# Patient Record
Sex: FEMALE | Race: WHITE | Employment: STUDENT | URBAN - METROPOLITAN AREA
[De-identification: names, ages, dates, MRNs, and addresses within clinical notes are randomized per-mention and may not be internally consistent; named-entity substitution may affect disease eponyms.]

---
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
## 2022-01-25 RX ORDER — NORGESTIMATE AND ETHINYL ESTRADIOL 0.25-0.035
1 KIT ORAL DAILY
Qty: 84 TABLET | Refills: 1 | Status: SHIPPED | OUTPATIENT
Start: 2022-01-25

## 2022-05-05 ENCOUNTER — TELEPHONE (OUTPATIENT)
Dept: FAMILY MEDICINE CLINIC | Facility: CLINIC | Age: 22
End: 2022-05-05

## 2022-05-05 NOTE — TELEPHONE ENCOUNTER
That is fine, but will need to be selected as \"Dispense as Written\" for that to happen. Please also make sure she knows this means she will likely have a higher co-pay since it is not generic.

## 2022-05-05 NOTE — TELEPHONE ENCOUNTER
Per patient,she is on Accutane,  she was on different kind of birth control for over a year and then was changed to a different brand  3 months ago (Chichi Cope ), then was changed again to a different brand  JEREMY  And she just started taking it 2 days ago. Patient  would like to request NOT TO CHANGE  her birth control medication  to a different brand  for her next refill. States that she would like to keep [address] birth control from now on. Informed that we will send the message to DR ANNIE SAHU.

## 2022-05-06 NOTE — TELEPHONE ENCOUNTER
Epic chart reviewed and patient advised that she is getting the same medication since August of last year. The manufacturers have been bought out a couple of times and the appearance of the medication may change. The components of the medication do not change. Reconfirmed this with the pharmacy and left message that If having symptoms that are of concern schedule an appointment with Dr. Denis Alanis to discuss.

## 2022-08-17 RX ORDER — NORGESTIMATE AND ETHINYL ESTRADIOL 0.25-0.035
1 KIT ORAL DAILY
Qty: 270 TABLET | Refills: 0 | Status: SHIPPED | OUTPATIENT
Start: 2022-08-17

## 2022-08-25 RX ORDER — NORGESTIMATE AND ETHINYL ESTRADIOL 0.25-0.035
1 KIT ORAL DAILY
Qty: 270 TABLET | Refills: 0 | Status: SHIPPED | OUTPATIENT
Start: 2022-08-25

## 2023-08-04 ENCOUNTER — OFFICE VISIT (OUTPATIENT)
Dept: SURGERY | Facility: CLINIC | Age: 23
End: 2023-08-04
Payer: COMMERCIAL

## 2023-08-04 VITALS
BODY MASS INDEX: 24.1 KG/M2 | SYSTOLIC BLOOD PRESSURE: 102 MMHG | WEIGHT: 146.38 LBS | HEIGHT: 65.5 IN | DIASTOLIC BLOOD PRESSURE: 56 MMHG

## 2023-08-04 DIAGNOSIS — Z01.419 WOMEN'S ANNUAL ROUTINE GYNECOLOGICAL EXAMINATION: ICD-10-CM

## 2023-08-04 DIAGNOSIS — Z12.4 ROUTINE CERVICAL SMEAR: Primary | ICD-10-CM

## 2023-08-04 PROCEDURE — 3074F SYST BP LT 130 MM HG: CPT | Performed by: OBSTETRICS & GYNECOLOGY

## 2023-08-04 PROCEDURE — 3008F BODY MASS INDEX DOCD: CPT | Performed by: OBSTETRICS & GYNECOLOGY

## 2023-08-04 PROCEDURE — 99385 PREV VISIT NEW AGE 18-39: CPT | Performed by: OBSTETRICS & GYNECOLOGY

## 2023-08-04 PROCEDURE — 88175 CYTOPATH C/V AUTO FLUID REDO: CPT | Performed by: OBSTETRICS & GYNECOLOGY

## 2023-08-04 PROCEDURE — 3078F DIAST BP <80 MM HG: CPT | Performed by: OBSTETRICS & GYNECOLOGY

## 2023-08-04 RX ORDER — LEVONORGESTREL AND ETHINYL ESTRADIOL 0.1-0.02MG
1 KIT ORAL DAILY
Qty: 90 TABLET | Refills: 3 | Status: SHIPPED | OUTPATIENT
Start: 2023-08-04 | End: 2024-07-29

## 2023-08-09 NOTE — TELEPHONE ENCOUNTER
Please review. Protocol failed / No Protocol. LOV 6/23    Sertraline will start on 25 mg daily and only increase to 50 mg if feeling stable without significant side effects after 1-2 weeks. Will check in with me in the next 4-6 weeks     Requested Prescriptions   Pending Prescriptions Disp Refills    sertraline 25 MG Oral Tab 90 tablet 0     Sig: Take 1 tablet (25 mg total) by mouth daily.        Psychiatric Non-Scheduled (Anti-Anxiety) Failed - 8/8/2023 11:34 AM        Failed - In person appointment or virtual visit in the past 6 mos or appointment in next 3 mos     Recent Outpatient Visits              5 days ago Routine cervical smear    [facility], [address], Dorthey Pay, [place]    Office Visit    7 months ago Severe anxiety with panic    [facility], [address]    Office Visit    2 years ago Other neutropenia [facility])    [facility] Hematology Oncology Ilah Dakin, MD    Office Visit    2 years ago [address], [address], [place], [place]    Office Visit    2 years ago [address], [address], [place], [place]    Office Visit

## 2023-10-16 ENCOUNTER — PATIENT MESSAGE (OUTPATIENT)
Facility: CLINIC | Age: 23
End: 2023-10-16

## 2023-11-13 NOTE — TELEPHONE ENCOUNTER
Protocol failed for appointment only  60 day refill given on 11/13/23, appointment needed for further refills.     Requested Prescriptions   Pending Prescriptions Disp Refills    SERTRALINE 50 MG Oral Tab [Pharmacy Med Name: SERTRALINE 50MG TABLETS] 90 tablet 0     Sig: TAKE 1 TABLET(50 MG) BY MOUTH DAILY       Psychiatric Non-Scheduled (Anti-Anxiety) Failed - 11/11/2023  4:08 PM        Failed - In person appointment or virtual visit in the past 6 mos or appointment in next 3 mos     Recent Outpatient Visits              3 months ago [facility] [address], [address], [place]    Office Visit    10 months ago Severe anxiety with panic    [facility], [address]    Office Visit    2 years ago Other neutropenia [facility])    [facility] Derrick Wooten MD    Office Visit    2 years ago [address], [address] [facility]    Office Visit    2 years ago [address], [address] [facility]    Office Visit                            Recent Outpatient Visits              3 months ago Routine cervical smear    [address], [address], Wolf Mcintosh MD    Office Visit    10 months ago Severe anxiety with panic    [facility], [address]    Office Visit    2 years ago Other neutropenia [facility])    [facility] On[facility] Derrick Wooten MD    Office Visit    2 years ago [address], [place], Car[place] Peppers, [facility]    Office Visit    2 years ago [address], [place], [place], [facility]    Office Visit

## 2023-11-27 RX ORDER — LEVONORGESTREL AND ETHINYL ESTRADIOL 0.1-0.02MG
1 KIT ORAL DAILY
Qty: 28 TABLET | Refills: 0 | OUTPATIENT
Start: 2023-11-27

## 2023-11-27 NOTE — TELEPHONE ENCOUNTER
Spoke with [facility],   verified  She stated pt just p/u rx on 23 for 90 days plus she has 2 more remaining refills. Rx denied       Requested Prescriptions     Pending Prescriptions Disp Refills    Levonorgestrel-Ethinyl Estrad (VIENVA) 0.1-20 MG-MCG Oral Tab 28 tablet 0     Sig: Take 1 tablet by mouth daily.

## 2024-01-11 NOTE — TELEPHONE ENCOUNTER
Message sent to patient to schedule follow up visit.  Please advise on refill request.      Requested Prescriptions     Pending Prescriptions Disp Refills    SERTRALINE 50 MG Oral Tab [Pharmacy Med Name: SERTRALINE 50MG TABLETS] 60 tablet 0     Sig: TAKE 1 TABLET BY MOUTH DAILY(APPOINTMENT NEEDED FOR FURTHER REFILLS)      Recent Visits  Date Type Provider Dept   12/20/22 Office Visit Armida Lopez DO [facility]   Showing recent visits within past 540 days with a meds authorizing provider and meeting all other requirements  Future Appointments  No visits were found meeting these conditions.  Showing future appointments within next 150 days with a meds authorizing provider and meeting all other requirements     Requested Prescriptions   Pending Prescriptions Disp Refills    SERTRALINE 50 MG Oral Tab [Pharmacy Med Name: SERTRALINE 50MG TABLETS] 60 tablet 0     Sig: TAKE 1 TABLET BY MOUTH DAILY(APPOINTMENT NEEDED FOR FURTHER REFILLS)       Psychiatric Non-Scheduled (Anti-Anxiety) Failed - 1/10/2024 12:18 PM        Failed - In person appointment or virtual visit in the past 6 mos or appointment in next 3 mos     Recent Outpatient Visits              1 month ago Anxiety    [facility] [place]    Office Visit    5 months ago Routine cervical smear    [facility] [place] - OB/GYN Amelia Rivera MD    Office Visit    1 year ago Severe anxiety with panic    [facility], [facility] Armida Lopez DO    Office Visit    2 years ago Other neutropenia (HCC)    [facility]ematology Oncology Marilyn Jj MD    Office Visit    2 years ago An[facility]    Office Visit                             Recent Outpatient Visits              1 month ago Anxiety    [facility] [place] Schultz,  ADELA Christianson    Office Visit    5 months ago Routine cervical smear    [facility], [place], [place] - OB/GYN Amelia Rivera MD    Office Visit    1 year ago Severe anxiety with panic    [facility], [facility] Armida Lopez DO    Office Visit    2 years ago Other neutropenia (HCC)    [facility]ematology Oncology Marilyn Jj MD    Office Visit    2 years ago Anxiety    [facility] [place], L.    Office Visit

## 2024-03-09 RX ORDER — SERTRALINE HYDROCHLORIDE 25 MG/1
25 TABLET, FILM COATED ORAL DAILY
Qty: 90 TABLET | Refills: 0 | OUTPATIENT
Start: 2024-03-09

## 2024-03-09 RX ORDER — BUSPIRONE HYDROCHLORIDE 15 MG/1
15 TABLET ORAL 3 TIMES DAILY
Qty: 270 TABLET | Refills: 0 | OUTPATIENT
Start: 2024-03-09

## 2024-03-09 RX ORDER — BUSPIRONE HYDROCHLORIDE 5 MG/1
5 TABLET ORAL 3 TIMES DAILY
Qty: 270 TABLET | Refills: 0 | OUTPATIENT
Start: 2024-03-09

## 2024-03-09 RX ORDER — LEVONORGESTREL AND ETHINYL ESTRADIOL 0.1-0.02MG
1 KIT ORAL DAILY
Qty: 90 TABLET | Refills: 0 | Status: SHIPPED | OUTPATIENT
Start: 2024-03-09

## 2024-03-09 NOTE — TELEPHONE ENCOUNTER
These appear to be out of date requests. Please contact patient as last time she checked in was taking Sertraline 50 mg daily and not taking Buspirone anymore. Please also ensure she schedules a visit when she is home or sets up a virtual visit while away at college.

## 2024-03-09 NOTE — TELEPHONE ENCOUNTER
RN refused Rx Sertraline 25mg and Buspirone prescriptions.   Not current medications per Dr. Lopez.   Pharmacy requested wrong medications, per patient.   3/8/24 Refill encounter - birth control sent. [facility]

## 2024-03-09 NOTE — TELEPHONE ENCOUNTER
Please review, no protocol/failed protocol for requested medication. Last office visit with PCP, 12/20/2022.

## 2024-03-10 NOTE — TELEPHONE ENCOUNTER
DispRefillsStartEnd  Levonorgestrel-Ethinyl Estrad (VIENVA) 0.1-20 MG-MCG Oral Tab90 [id number]/9/2024--Sig - Route: Take 1 tablet by mouth daily. - OralSent to pharmacy as: Levonorgestrel-Ethinyl Estrad 0.1-20 MG-MCG Oral Tablet (Vienva)E-Prescribing Status: Receipt confirmed by pharmacy (3/9/2024  7:51 AM CST)

## 2024-04-19 NOTE — TELEPHONE ENCOUNTER
Please Review. Protocol Failed; No Protocol   Mycharted pt to schedule appt.   Requested Prescriptions   Pending Prescriptions Disp Refills    SERTRALINE 50 MG Oral Tab [Pharmacy Med Name: SERTRALINE 50MG TABLETS] 90 tablet 0     Sig: TAKE 1 TABLET(50 MG) BY MOUTH DAILY       Psychiatric Non-Scheduled (Anti-Anxiety) Failed - 4/18/2024  3:12 PM        Failed - In person appointment or virtual visit in the past 6 mos or appointment in next 3 mos     Recent Outpatient Visits              5 months ago [facility]    Office Visit    8 months ago Routine cervical smear    [facility] [place] [facility] Amelia Rivera MD    Office Visit    1 year ago [facility] anxiety with panic    [facility]    [facility]    2 years ago Other neutropenia ([facility]    [facility] Hematology Oncology Marilyn Jj MD    Office Visit    2 years ago Anxiety    [facility] [place], L.[facility]    Office Visit                      Failed - Depression Screening completed within the past 12 months                 Recent Outpatient Visits              5 months ago Anxiety    [facility] Yamel, L.AC    Office Visit    8 months ago Routine cervical smear    [facility] [place], [place] - OB/Amelia Koo MD    Office Visit    1 year ago Severe anxiety with panic    [facility]    Office Visit    2 years ago Other neutropenia (HCC)    [facility] Hematology Oncology Marilyn Jj MD    Office Visit    2 years ago Anxiety    [facility], L.AC    Office Visit

## 2024-05-24 ENCOUNTER — TELEPHONE (OUTPATIENT)
Facility: CLINIC | Age: 24
End: 2024-05-24

## 2024-05-24 RX ORDER — NITROFURANTOIN 25; 75 MG/1; MG/1
100 CAPSULE ORAL 2 TIMES DAILY
Qty: 10 CAPSULE | Refills: 0 | Status: SHIPPED | OUTPATIENT
Start: 2024-05-24 | End: 2024-05-29

## 2024-05-24 NOTE — TELEPHONE ENCOUNTER
Patient is in [place] for her brother's graduation and is now having significant UTI symptoms, including dysuria and frequent urination. Can not take Azo due to severe vomiting in the past. Will start her on Nitrofurantoin bid x 5 days, and call back if worsening or go to IC in [place].

## 2024-06-07 ENCOUNTER — PATIENT MESSAGE (OUTPATIENT)
Dept: OBGYN CLINIC | Facility: CLINIC | Age: 24
End: 2024-06-07

## 2024-06-07 RX ORDER — LEVONORGESTREL/ETHIN.ESTRADIOL 0.1-0.02MG
1 TABLET ORAL DAILY
Qty: 90 TABLET | Refills: 1 | Status: SHIPPED | OUTPATIENT
Start: 2024-06-07

## 2024-06-07 NOTE — TELEPHONE ENCOUNTER
Please review. Protocol failed / No protocol.    Requested Prescriptions   Pending Prescriptions Disp Refills    Levonorgestrel-Ethinyl Estrad (VIENVA) 0.1-20 MG-MCG Oral Tab 90 tablet 0     Sig: Take 1 tablet by mouth daily.       Gynecology Medication Protocol Failed - 6/6/2024  2:51 PM        Failed - Physical or Pelvic/Breast in past 12 or next 3 mos--VIA MANUAL LOOKUP        Passed - PASS-PENDING LAST PAP WNL--VIA MANUAL LOOKUP             Recent Outpatient Visits              6 months ago Anxiety    [facility], Johanna Pisano Yamel, L.AC    Office Visit    10 months ago Routine cervical smear    [facility], [place], [place] - OB/GYN Amelia Rivera MD    Office Visit    1 year ago Severe anxiety with panic    [facility], [facility] Armida Lopez DO    Office Visit    2 years ago Other neutropenia (HCC)    [facility] Hematology Oncology Marilyn Jj MD    Office Visit    3 years ago Anxiety    [facility] [place]    Office Visit

## 2024-06-07 NOTE — TELEPHONE ENCOUNTER
Outgoing call to patient to inform a follow up appointment is needed for refills .    A detail message was  left .

## 2024-06-09 RX ORDER — LEVONORGESTREL/ETHIN.ESTRADIOL 0.1-0.02MG
1 TABLET ORAL DAILY
Qty: 90 TABLET | Refills: 1 | Status: CANCELLED | OUTPATIENT
Start: 2024-06-09

## 2024-06-10 NOTE — TELEPHONE ENCOUNTER
From: Miles Galvan  To: Amelia Rivera  Sent: 6/7/2024 9:51 PM CDT  Subject: Birth control    Hi Dr. Rivera,  I need to refill my birth control and I cannot with my regular doctor since I am overdue for an annual physical. Is there any way you could refill it for me? I completely forgot until now and I need it within the next day or so. It is Vienva. And I have a different [facility] that I would need to pick it up at since I am at school.

## 2024-06-11 NOTE — TELEPHONE ENCOUNTER
Pharmacy    [id number] - [facility] [address] [facility], [phone], [phone]        Disp Refills Start End    Levonorgestrel-Ethinyl Estrad (VIENVA) 0.1-20 MG-MCG Oral Tab 90 tablet 1 6/7/2024 --    Sig - Route: Take 1 tablet by mouth daily. - Oral    Sent to pharmacy as: Levonorgestrel-Ethinyl Estrad 0.1-20 MG-MCG Oral Tablet (Vienva)    E-Prescribing Status: Receipt confirmed by pharmacy (6/7/2024  9:56 AM CDT)

## 2024-07-02 ENCOUNTER — OFFICE VISIT (OUTPATIENT)
Facility: CLINIC | Age: 24
End: 2024-07-02

## 2024-07-02 VITALS
HEART RATE: 73 BPM | OXYGEN SATURATION: 98 % | SYSTOLIC BLOOD PRESSURE: 116 MMHG | HEIGHT: 65.5 IN | WEIGHT: 142 LBS | DIASTOLIC BLOOD PRESSURE: 74 MMHG | BODY MASS INDEX: 23.37 KG/M2

## 2024-07-02 DIAGNOSIS — Z23 NEED FOR VACCINATION: ICD-10-CM

## 2024-07-02 DIAGNOSIS — Z00.00 ENCOUNTER FOR ROUTINE ADULT HEALTH EXAMINATION WITHOUT ABNORMAL FINDINGS: Primary | ICD-10-CM

## 2024-07-02 DIAGNOSIS — F41.9 ANXIETY: ICD-10-CM

## 2024-07-02 DIAGNOSIS — Z30.015 ENCOUNTER FOR INITIAL PRESCRIPTION OF VAGINAL RING HORMONAL CONTRACEPTIVE: ICD-10-CM

## 2024-07-02 DIAGNOSIS — Z11.3 VENEREAL DISEASE SCREENING: ICD-10-CM

## 2024-07-02 PROCEDURE — 90715 TDAP VACCINE 7 YRS/> IM: CPT | Performed by: FAMILY MEDICINE

## 2024-07-02 PROCEDURE — 90471 IMMUNIZATION ADMIN: CPT | Performed by: FAMILY MEDICINE

## 2024-07-02 PROCEDURE — 99395 PREV VISIT EST AGE 18-39: CPT | Performed by: FAMILY MEDICINE

## 2024-07-02 PROCEDURE — 87591 N.GONORRHOEAE DNA AMP PROB: CPT | Performed by: FAMILY MEDICINE

## 2024-07-02 PROCEDURE — 87491 CHLMYD TRACH DNA AMP PROBE: CPT | Performed by: FAMILY MEDICINE

## 2024-07-02 RX ORDER — ETONOGESTREL AND ETHINYL ESTRADIOL VAGINAL RING .015; .12 MG/D; MG/D
RING VAGINAL
Qty: 3 RING | Refills: 3 | Status: SHIPPED | OUTPATIENT
Start: 2024-07-02

## 2024-07-02 NOTE — PROGRESS NOTES
HPI:   Miles Galvan is a 24 year old female who presents for a complete physical exam.     Had more higher highs and lower lows this past college semester, which was partly triggered by breaking up with her boyfriend. She ended up making some really good friends her second semester, and she was happy with her job, which helped. Has not had a therapist in awhile. Denies any episodes of cecy. Does feel the Sertraline has really helped.   She is interested in trying the Nuvaring as she finds it tedious to take the pill every day.     Last pap: 8/2023 and normal   Menses: Regular, monthly cycles   Contraception:  OCP's    History of STD's: None  History of intimate partner violence: None  Family hx of breast, ovarian, cervical or colon CA: None  Diet and exercise: Does not always eat breakfast, but always has a good dinner. Dinner is typically vegetables, meat, and a grain. Drinks a lot of water. Does a lot of hot yoga.   Immunizations:  Tdap: 2013, HPV: Completed, Covid: Completed 3 doses    REVIEW OF SYSTEMS:   GENERAL: feels well otherwise   SKIN: denies any unusual skin lesions  EYES: no vision problems  BREAST: no lumps or masses, no nipple discharge   LUNGS: denies shortness of breath  CARDIOVASCULAR: denies chest pain  GI: denies abdominal pain,  No constipation or diarrhea, no hematochezia  : denies dysuria, vaginal discharge or itching  NEURO: denies headaches  PSYCHE: intermittent depression and anxiety           Current Outpatient Medications   Medication Sig Dispense Refill    Etonogestrel-Ethinyl Estradiol 0.12-0.015 MG/24HR Vaginal Ring Insert Nuvaring vaginally for three 3 weeks and then remove for one week. 3 Ring 3    sertraline 50 MG Oral Tab Take 1 tablet (50 mg total) by mouth daily. 90 tablet 1    cetirizine 10 MG Oral Tab Take 1 tablet (10 mg total) by mouth daily.      EPIPEN 2-ANA 0.3 MG/0.3ML Injection Solution Auto-injector Inject 1 Device into the muscle as needed.  0     No Known  Allergies   Past Medical History:    Amenorrhea    Anemia    Anxiety    Hx of seasonal allergies      Past Surgical History:   Procedure Laterality Date    Patient denies any surgical history      as of 08/4/23      Family History   Problem Relation Age of Onset    Bipolar Disorder Maternal Grandmother     Breast Cancer Neg     Ovarian Cancer Neg     Uterine Cancer Neg       Social History:   Social History     Socioeconomic History    Marital status: Single   Tobacco Use    Smoking status: Never    Smokeless tobacco: Never    Tobacco comments:     vaped in high school.    Vaping Use    Vaping status: Former   Substance and Sexual Activity    Alcohol use: Yes     Alcohol/week: 1.0 - 2.0 standard drink of alcohol     Types: 1 - 2 Glasses of wine per week    Drug use: Yes     Frequency: 7.0 times per week     Types: Cannabis     Comment: Smokes and edibles    Sexual activity: Yes     Partners: Male     Birth control/protection: Pill, Condom   Other Topics Concern    Reaction to local anesthetic No    Caffeine Concern No    Exercise No    Seat Belt No    Special Diet No    Stress Concern No    Weight Concern No     Occ: Plans to work at a [employer]. : No. Children: None.         EXAM:     Wt Readings from Last 6 Encounters:   07/02/24 142 lb (64.4 kg)   08/04/23 146 lb 6.4 oz (66.4 kg)   12/20/22 144 lb (65.3 kg)   07/27/21 134 lb (60.8 kg) (62%, Z= 0.29)*   12/18/20 138 lb (62.6 kg) (70%, Z= 0.52)*   07/21/20 136 lb (61.7 kg) (69%, Z= 0.49)*     * Growth percentiles are based on [facility] (Girls, 2-20 Years) data.     Body mass index is 23.27 kg/m².   /74   Pulse 73   Ht 5' 5.5\" (1.664 m)   Wt 142 lb (64.4 kg)   LMP 07/02/2024 (Exact Date)   SpO2 98%   BMI 23.27 kg/m²     GENERAL: well developed, well nourished, in no apparent distress   SKIN: no rashes, no suspicious lesions  HEENT: atraumatic, normocephalic, throat clear; normal dentition  EYES: PERRLA, EOMI, conjunctiva are clear  NECK: supple, no  adenopathy or thyroid masses   BREAST: deferred, sees gynecology   LUNGS: clear to auscultation  CARDIO: RRR without murmur  GI: good bowel sounds, no masses, HSM or tenderness  : deferred, sees gynecology   EXTREMITIES: no edema    No results found for: \"CHOLEST\", \"HDL\", \"LDL\", \"TRIGLY\"   ASSESSMENT AND PLAN:   Miles Galvan is a 24 year old female who presents for a complete physical exam.  Encounter Diagnoses   Name Primary?    Encounter for routine adult health examination without abnormal findings Yes    Anxiety     Venereal disease screening     Encounter for initial prescription of vaginal ring hormonal contraceptive     Need for vaccination      Orders Placed This Encounter   Procedures    TdaP (Boostrix/Adacel) Vaccine (> 7 Y)    Chlamydia/Gc Amplification     Anxiety well controlled with Sertraline 50 mg daily, and refills given. Consider therapy if she experiences more fluctuations in mood, and also to avoid using cannabis to self medicate.  Will trial Nuvaring instead of OCP's due to difficulties remembering to take the pill daily, and instructions on use given.     Discussed with patient the following:  -Monthly breast self-exam  -Breast cancer screening/mammograms and clinical breast exams  -Cervical cancer screening/pap smears  -Adequate calcium and Vitamin D intake to prevent osteoporosis  -Healthy diet including adequate intake of vegetables and fruits, appropriate portion sizes, minimizing highly concentrated carbohydrate foods  -Exercising 30 minutes a day most days of the week   -Diabetes screening   -Cholesterol screening  -Recommendation for yearly influenza vaccine  -Need for HPV vaccination series: completed   -Need for Tdap once as an adult and Td booster every 10 years  -Contraception: Nuvaring  -STI screening (GC/Chlamydia/HIV): Check GC/Chlamydia   -Hepatitis C screening for all adults between the ages of 18 and 79: Checked and negative     All questions were answered during the  visit and the patient verbalizes understanding. She will return in one year for next WWE or sooner as needed.    Meds & Refills for this Visit:  Requested Prescriptions     Signed Prescriptions Disp Refills    Etonogestrel-Ethinyl Estradiol 0.12-0.015 MG/24HR Vaginal Ring 3 Ring 3     Sig: Insert Nuvaring vaginally for three 3 weeks and then remove for one week.    sertraline 50 MG Oral Tab 90 tablet 1     Sig: Take 1 tablet (50 mg total) by mouth daily.       Imaging & Consults:  TETANUS, DIPHTHERIA TOXOIDS AND ACELLULAR PERTUSIS VACCINE (TDAP), >7 YEARS, IM USE    Armida Lopez DO  7/2/2024  4:36 PM

## 2024-07-03 LAB
C TRACH DNA SPEC QL NAA+PROBE: NEGATIVE
N GONORRHOEA DNA SPEC QL NAA+PROBE: NEGATIVE

## 2024-09-03 RX ORDER — ETONOGESTREL AND ETHINYL ESTRADIOL VAGINAL RING .015; .12 MG/D; MG/D
RING VAGINAL
Qty: 3 RING | Refills: 3 | OUTPATIENT
Start: 2024-09-03

## 2025-03-27 RX ORDER — SERTRALINE HYDROCHLORIDE 100 MG/1
100 TABLET, FILM COATED ORAL DAILY
Qty: 90 TABLET | Refills: 0 | Status: SHIPPED | OUTPATIENT
Start: 2025-03-27

## 2025-06-24 RX ORDER — SERTRALINE HYDROCHLORIDE 100 MG/1
100 TABLET, FILM COATED ORAL DAILY
Qty: 90 TABLET | Refills: 1 | Status: SHIPPED | OUTPATIENT
Start: 2025-06-24

## 2025-06-24 RX ORDER — ETONOGESTREL AND ETHINYL ESTRADIOL VAGINAL RING .015; .12 MG/D; MG/D
RING VAGINAL
Qty: 3 RING | Refills: 1 | Status: SHIPPED | OUTPATIENT
Start: 2025-06-24

## 2025-06-26 ENCOUNTER — TELEPHONE (OUTPATIENT)
Facility: CLINIC | Age: 25
End: 2025-06-26

## 2025-06-26 NOTE — TELEPHONE ENCOUNTER
Prior authorization for Etonogestrel-Ethinyl Estradiol 0.12-0.015 MG/24HR Vaginal Ring  completed through [email or web address]

## 2025-06-28 NOTE — TELEPHONE ENCOUNTER
Approved    Prior authorization approved  Payer: [facility] Case ID: [id number]    [phone]  Note from payer: The case has been Approved from  [id number] to [id number]  Approval Details    Authorized from May 28, 2025 to June 27, 2026  Electronic appeal: Not supported

## (undated) NOTE — MR AVS SNAPSHOT
[facility] - [place] [address] [id number] [phone]               Thank you for choosing us for your health care visit with Rudolph Martinez MD.  We are glad to serve you and happy to provide you with this summary o This list is accurate as of: 3/14/17  5:21 PM.  Always use your most recent med list.                CALCIUM 600 OR   Take by mouth. * Chloroquine Phosphate 500 MG Tabs   Take 1 tablet (500 mg total) by mouth once a week.    Commonly known as:  AR

## (undated) NOTE — LETTER
Date: 8/6/2018    Patient Name: Forest Barros          To Whom it may concern: This letter has been written at the patient's request. The above patient was seen at the [facility] for treatment of a medical condition.     This patie

## (undated) NOTE — MR AVS SNAPSHOT
[address] at [facility]  [address] [facility], [facility] [address]  [phone]               Thank you for choosing us for your health care visit with Janine Mooney DO.   We are glad to serve you and happy to provide you with th [address], [phone], [address], [address] [address]     Phone:  [phone]    - Chloroquine Phosphate 500 MG Tabs  - EPINEPHrine 0.3 MG/0.3ML Soaj      You can get these medications from schedule your appointment. Failure to obtain required authorization numbers can create reimbursement difficulties for you.         15year old female with known birch and grass allergies per allergy testing 3-4 years ago, but with worsening hives and throat

## (undated) NOTE — MR AVS SNAPSHOT
[address] at [facility]  [address] [facility], [address]               Thank you for choosing us for your health care visit with Anamika Craven DO.   We are glad to serve you and happy to provide return off and on over several weeks or longer. Home care  Follow these guidelines when caring for yourself at home:  · Rest quietly in bed if your symptoms are severe. Change position slowly.  There is usually one position that will feel best. This might Today's Vital Signs     BP Pulse Temp    102/58 mmHg (17 %, Z = -0.95 / 22 %, Z = -0.76*) 86 98.7 °F (37.1 °C) (Oral)    Height Weight BMI    65.5\" (76 %*, Z = 0.70) 140 lb (84 %*, Z = 0.98) 22.93 kg/m2 (79 %*, Z = 0.82)    Breastfeeding?           No Your physician has referred you to a specialist.  Your physician or the clinic staff will provide you with the phone number you should call to schedule your appointment.      If you are confident that your benefit plan will not require a referral or authori help them live a healthy active lifestyle.     To lead a healthy active life, families can strive to reach these goals:  o 5 servings of fruits and vegetables a day  o 4 servings of water a day  o 3 servings of low-fat dairy a day  o 2 or less hours of scre